# Patient Record
Sex: FEMALE | Race: BLACK OR AFRICAN AMERICAN | NOT HISPANIC OR LATINO | URBAN - METROPOLITAN AREA
[De-identification: names, ages, dates, MRNs, and addresses within clinical notes are randomized per-mention and may not be internally consistent; named-entity substitution may affect disease eponyms.]

---

## 2017-08-31 ENCOUNTER — EMERGENCY (EMERGENCY)
Facility: HOSPITAL | Age: 32
LOS: 1 days | Discharge: HOME | End: 2017-08-31
Attending: EMERGENCY MEDICINE | Admitting: EMERGENCY MEDICINE
Payer: COMMERCIAL

## 2017-08-31 VITALS
DIASTOLIC BLOOD PRESSURE: 75 MMHG | RESPIRATION RATE: 20 BRPM | TEMPERATURE: 98 F | WEIGHT: 134.92 LBS | SYSTOLIC BLOOD PRESSURE: 115 MMHG | HEART RATE: 92 BPM | OXYGEN SATURATION: 99 % | HEIGHT: 64 IN

## 2017-08-31 DIAGNOSIS — S31.41XA LACERATION WITHOUT FOREIGN BODY OF VAGINA AND VULVA, INITIAL ENCOUNTER: ICD-10-CM
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
## 2017-08-31 DIAGNOSIS — N93.9 ABNORMAL UTERINE AND VAGINAL BLEEDING, UNSPECIFIED: ICD-10-CM
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
## 2017-08-31 LAB
APPEARANCE UR: CLEAR — SIGNIFICANT CHANGE UP
BILIRUB UR-MCNC: NEGATIVE — SIGNIFICANT CHANGE UP
COLOR SPEC: YELLOW — SIGNIFICANT CHANGE UP
DIFF PNL FLD: (no result)
GLUCOSE UR QL: NEGATIVE — SIGNIFICANT CHANGE UP
KETONES UR-MCNC: (no result) MG/DL
LEUKOCYTE ESTERASE UR-ACNC: NEGATIVE — SIGNIFICANT CHANGE UP
NITRITE UR-MCNC: NEGATIVE — SIGNIFICANT CHANGE UP
PH UR: 5.5 — SIGNIFICANT CHANGE UP (ref 5–8)
PROT UR-MCNC: NEGATIVE MG/DL — SIGNIFICANT CHANGE UP
SP GR SPEC: 1.02 — SIGNIFICANT CHANGE UP (ref 1–1.03)
UROBILINOGEN FLD QL: 0.2 E.U./DL — SIGNIFICANT CHANGE UP

## 2017-08-31 PROCEDURE — 12002 RPR S/N/AX/GEN/TRNK2.6-7.5CM: CPT

## 2017-08-31 PROCEDURE — 81001 URINALYSIS AUTO W/SCOPE: CPT

## 2017-08-31 PROCEDURE — 99284 EMERGENCY DEPT VISIT MOD MDM: CPT | Mod: 25

## 2017-08-31 NOTE — ED ADULT NURSE NOTE - CHPI ED SYMPTOMS NEG
no vomiting/no abdominal pain/no chills/no nausea/no discharge/no vaginal discharge/no dysuria/no back pain/no fever

## 2017-08-31 NOTE — ED ADULT NURSE NOTE - OBJECTIVE STATEMENT
Pt walked into Ed with c/o of vaginal mild bright red bleeding noted, after intercourse. Pt. onset was today after vaginal sexual intercourse. Condom was used. Denies fever, chills, weakness, vaginal discharge, odor, abd pain, hematuria.

## 2017-08-31 NOTE — ED PROVIDER NOTE - OBJECTIVE STATEMENT
bleeding from vaginal region after relations tonight and tenderness associated and thus to ED for care

## 2017-08-31 NOTE — ED ADULT NURSE NOTE - EXTERNAL GENITALIA
laceration to the right inner labia, first inner medial laceration is 1inch and second laceration is 3cm with mild bleeding,.

## 2017-08-31 NOTE — ED PROVIDER NOTE - ATTENDING CONTRIBUTION TO CARE
31 yof pw vaginal bleeding after sexual activity tonight.      agree w/ PA, avss, per PA, noted vaginal laceration, will obtain GYN consult for repair/eval.

## 2017-08-31 NOTE — ED ADULT TRIAGE NOTE - ARRIVAL INFO ADDITIONAL COMMENTS
states she noticed bright red blood, "spotting" after having sex an hour ago. c.o vaginal pain, denies saturating any pads, clots, weakness, dizziness. or any other medical complaints. states her "period ended last week". steady gait noted.

## 2020-02-04 ENCOUNTER — APPOINTMENT (OUTPATIENT)
Dept: OPHTHALMOLOGY | Facility: CLINIC | Age: 35
End: 2020-02-04

## 2021-12-27 ENCOUNTER — APPOINTMENT (OUTPATIENT)
Dept: AFTER HOURS CARE | Facility: EMERGENCY ROOM | Age: 36
End: 2021-12-27
Payer: COMMERCIAL

## 2021-12-28 DIAGNOSIS — J45.21 MILD INTERMITTENT ASTHMA WITH (ACUTE) EXACERBATION: ICD-10-CM

## 2021-12-28 PROBLEM — Z00.00 ENCOUNTER FOR PREVENTIVE HEALTH EXAMINATION: Status: ACTIVE | Noted: 2021-12-28

## 2021-12-28 PROCEDURE — 99204 OFFICE O/P NEW MOD 45 MIN: CPT | Mod: 95

## 2021-12-28 RX ORDER — PREDNISONE 50 MG/1
50 TABLET ORAL DAILY
Qty: 7 | Refills: 0 | Status: ACTIVE | COMMUNITY
Start: 2021-12-28 | End: 1900-01-01

## 2021-12-28 RX ORDER — ALBUTEROL SULFATE 90 UG/1
108 (90 BASE) INHALANT RESPIRATORY (INHALATION)
Qty: 1 | Refills: 0 | Status: ACTIVE | COMMUNITY
Start: 2021-12-28 | End: 1900-01-01

## 2021-12-28 NOTE — HISTORY OF PRESENT ILLNESS
[Home] : at home, [unfilled] , at the time of the visit. [Other Location: e.g. Home (Enter Location, City,State)___] : at [unfilled] [Verbal consent obtained from patient] : the patient, [unfilled] [FreeTextEntry8] : 36F hx bipolar and asthma (never hospitalized, last ED visit 10yrs ago, not on any asthma medication x1/2 yr) now p/w 3d URI sx (mult neg COVID tests) followed by wheezing today and SOB when she was lying down to sleep tonight. No hemoptysis, n/v, fever, neck pain/stiffness, voice changes, stridor

## 2021-12-28 NOTE — PLAN
[With new medications prescribed] : Treat in place: with new medications prescribed [FreeTextEntry1] : rx prednisone\par rx albuterol\par URI/asthma guidance and precautions\par PMD follow up